# Patient Record
Sex: MALE | URBAN - METROPOLITAN AREA
[De-identification: names, ages, dates, MRNs, and addresses within clinical notes are randomized per-mention and may not be internally consistent; named-entity substitution may affect disease eponyms.]

---

## 2022-11-16 ENCOUNTER — APPOINTMENT (OUTPATIENT)
Dept: URBAN - METROPOLITAN AREA CLINIC 215 | Age: 67
Setting detail: DERMATOLOGY
End: 2022-11-21

## 2022-11-16 DIAGNOSIS — D22 MELANOCYTIC NEVI: ICD-10-CM

## 2022-11-16 DIAGNOSIS — D485 NEOPLASM OF UNCERTAIN BEHAVIOR OF SKIN: ICD-10-CM

## 2022-11-16 DIAGNOSIS — L81.4 OTHER MELANIN HYPERPIGMENTATION: ICD-10-CM

## 2022-11-16 DIAGNOSIS — D18.0 HEMANGIOMA: ICD-10-CM

## 2022-11-16 DIAGNOSIS — L57.0 ACTINIC KERATOSIS: ICD-10-CM

## 2022-11-16 DIAGNOSIS — L82.1 OTHER SEBORRHEIC KERATOSIS: ICD-10-CM

## 2022-11-16 PROBLEM — D18.01 HEMANGIOMA OF SKIN AND SUBCUTANEOUS TISSUE: Status: ACTIVE | Noted: 2022-11-16

## 2022-11-16 PROBLEM — D48.5 NEOPLASM OF UNCERTAIN BEHAVIOR OF SKIN: Status: ACTIVE | Noted: 2022-11-16

## 2022-11-16 PROBLEM — D22.5 MELANOCYTIC NEVI OF TRUNK: Status: ACTIVE | Noted: 2022-11-16

## 2022-11-16 PROCEDURE — OTHER PRESCRIPTION MEDICATION MANAGEMENT: OTHER

## 2022-11-16 PROCEDURE — 99204 OFFICE O/P NEW MOD 45 MIN: CPT | Mod: 25

## 2022-11-16 PROCEDURE — OTHER BIOPSY BY SHAVE METHOD: OTHER

## 2022-11-16 PROCEDURE — OTHER MIPS QUALITY: OTHER

## 2022-11-16 PROCEDURE — OTHER COUNSELING: OTHER

## 2022-11-16 PROCEDURE — 11102 TANGNTL BX SKIN SINGLE LES: CPT

## 2022-11-16 ASSESSMENT — LOCATION DETAILED DESCRIPTION DERM
LOCATION DETAILED: RIGHT INFERIOR TEMPLE
LOCATION DETAILED: LEFT SUPERIOR UPPER BACK
LOCATION DETAILED: RIGHT CENTRAL MALAR CHEEK
LOCATION DETAILED: LEFT MEDIAL UPPER BACK
LOCATION DETAILED: LEFT CENTRAL MALAR CHEEK
LOCATION DETAILED: LEFT SUPERIOR LATERAL MALAR CHEEK
LOCATION DETAILED: RIGHT MEDIAL FOREHEAD
LOCATION DETAILED: LEFT PROXIMAL POSTERIOR UPPER ARM
LOCATION DETAILED: RIGHT SUPERIOR MEDIAL MALAR CHEEK
LOCATION DETAILED: RIGHT MID-UPPER BACK
LOCATION DETAILED: RIGHT CENTRAL LATERAL NECK
LOCATION DETAILED: LEFT INFERIOR MEDIAL UPPER BACK

## 2022-11-16 ASSESSMENT — LOCATION ZONE DERM
LOCATION ZONE: ARM
LOCATION ZONE: FACE
LOCATION ZONE: NECK
LOCATION ZONE: TRUNK

## 2022-11-16 ASSESSMENT — LOCATION SIMPLE DESCRIPTION DERM
LOCATION SIMPLE: LEFT UPPER ARM
LOCATION SIMPLE: LEFT CHEEK
LOCATION SIMPLE: LEFT UPPER BACK
LOCATION SIMPLE: RIGHT UPPER BACK
LOCATION SIMPLE: RIGHT FOREHEAD
LOCATION SIMPLE: RIGHT CHEEK
LOCATION SIMPLE: RIGHT TEMPLE
LOCATION SIMPLE: NECK

## 2022-11-16 NOTE — PROCEDURE: PRESCRIPTION MEDICATION MANAGEMENT
Initiate Treatment: Skin Medicinals 5FU/Calcipotriene BID x 5 days.  Treat forehead/temples, then cheeks/nose.
Detail Level: Zone
Render In Strict Bullet Format?: No

## 2023-02-09 ENCOUNTER — APPOINTMENT (OUTPATIENT)
Dept: URBAN - METROPOLITAN AREA CLINIC 216 | Age: 68
Setting detail: DERMATOLOGY
End: 2023-02-13

## 2023-02-09 VITALS
OXYGEN SATURATION: 94 % | SYSTOLIC BLOOD PRESSURE: 164 MMHG | DIASTOLIC BLOOD PRESSURE: 90 MMHG | TEMPERATURE: 97.3 F | HEART RATE: 70 BPM

## 2023-02-09 PROBLEM — D04.4 CARCINOMA IN SITU OF SKIN OF SCALP AND NECK: Status: ACTIVE | Noted: 2023-02-09

## 2023-02-09 PROCEDURE — 12042 INTMD RPR N-HF/GENIT2.6-7.5: CPT

## 2023-02-09 PROCEDURE — 17311 MOHS 1 STAGE H/N/HF/G: CPT

## 2023-02-09 PROCEDURE — OTHER TREATMENT REGIMEN: OTHER

## 2023-02-09 PROCEDURE — OTHER MOHS SURGERY: OTHER

## 2023-02-09 NOTE — PROCEDURE: MOHS SURGERY
Medication/Dose: Adderall XR  Patient last seen by PCP: 01/29/20  Next office visit with PCP: none   Last Lab:02/24/17    PDMP needs to be reviewed by Provider    Sent to provider to approve or deny       Consent (Lip)/Introductory Paragraph: The rationale for Mohs was explained to the patient and consent was obtained. The risks, benefits and alternatives to therapy were discussed in detail. Specifically, the risks of lip deformity, changes in the oral aperture, infection, scarring, bleeding, prolonged wound healing, incomplete removal, allergy to anesthesia, nerve injury and recurrence were addressed. Prior to the procedure, the treatment site was clearly identified and confirmed by the patient. All components of Universal Protocol/PAUSE Rule completed.

## 2023-11-01 ENCOUNTER — APPOINTMENT (OUTPATIENT)
Dept: URBAN - METROPOLITAN AREA CLINIC 215 | Age: 68
Setting detail: DERMATOLOGY
End: 2023-11-01

## 2023-11-01 DIAGNOSIS — L81.4 OTHER MELANIN HYPERPIGMENTATION: ICD-10-CM

## 2023-11-01 DIAGNOSIS — D22 MELANOCYTIC NEVI: ICD-10-CM

## 2023-11-01 DIAGNOSIS — D485 NEOPLASM OF UNCERTAIN BEHAVIOR OF SKIN: ICD-10-CM

## 2023-11-01 DIAGNOSIS — D18.0 HEMANGIOMA: ICD-10-CM

## 2023-11-01 DIAGNOSIS — Z85.828 PERSONAL HISTORY OF OTHER MALIGNANT NEOPLASM OF SKIN: ICD-10-CM

## 2023-11-01 DIAGNOSIS — L82.1 OTHER SEBORRHEIC KERATOSIS: ICD-10-CM

## 2023-11-01 PROBLEM — D18.01 HEMANGIOMA OF SKIN AND SUBCUTANEOUS TISSUE: Status: ACTIVE | Noted: 2023-11-01

## 2023-11-01 PROBLEM — D48.5 NEOPLASM OF UNCERTAIN BEHAVIOR OF SKIN: Status: ACTIVE | Noted: 2023-11-01

## 2023-11-01 PROBLEM — D22.5 MELANOCYTIC NEVI OF TRUNK: Status: ACTIVE | Noted: 2023-11-01

## 2023-11-01 PROCEDURE — 99213 OFFICE O/P EST LOW 20 MIN: CPT | Mod: 25

## 2023-11-01 PROCEDURE — OTHER COUNSELING: OTHER

## 2023-11-01 PROCEDURE — 11102 TANGNTL BX SKIN SINGLE LES: CPT

## 2023-11-01 PROCEDURE — 11103 TANGNTL BX SKIN EA SEP/ADDL: CPT

## 2023-11-01 PROCEDURE — OTHER BIOPSY BY SHAVE METHOD: OTHER

## 2023-11-01 ASSESSMENT — LOCATION SIMPLE DESCRIPTION DERM
LOCATION SIMPLE: LEFT LOWER BACK
LOCATION SIMPLE: RIGHT UPPER BACK
LOCATION SIMPLE: LEFT UPPER BACK
LOCATION SIMPLE: NECK
LOCATION SIMPLE: RIGHT INDEX FINGER

## 2023-11-01 ASSESSMENT — LOCATION DETAILED DESCRIPTION DERM
LOCATION DETAILED: LEFT INFERIOR MEDIAL UPPER BACK
LOCATION DETAILED: RIGHT INDEX PROXIMAL INTERPHALANGEAL JOINT
LOCATION DETAILED: LEFT SUPERIOR MEDIAL LOWER BACK
LOCATION DETAILED: RIGHT CENTRAL LATERAL NECK
LOCATION DETAILED: LEFT MEDIAL UPPER BACK
LOCATION DETAILED: LEFT SUPERIOR UPPER BACK
LOCATION DETAILED: RIGHT MID-UPPER BACK

## 2023-11-01 ASSESSMENT — LOCATION ZONE DERM
LOCATION ZONE: TRUNK
LOCATION ZONE: NECK
LOCATION ZONE: FINGER

## 2023-11-01 NOTE — PROCEDURE: BIOPSY BY SHAVE METHOD
Body Location Override (Optional - Billing Will Still Be Based On Selected Body Map Location If Applicable): left medial lower back

## 2023-11-01 NOTE — PROCEDURE: BIOPSY BY SHAVE METHOD
Body Location Override (Optional - Billing Will Still Be Based On Selected Body Map Location If Applicable): right 2nd PIP

## 2023-11-01 NOTE — HPI: SKIN LESION
What Type Of Note Output Would You Prefer (Optional)?: Standard Output
Is This A New Presentation, Or A Follow-Up?: Skin Lesion
Additional History: Patient reports little bumps starting coming up 2 years ago. They come and will completely go away.

## 2024-11-06 ENCOUNTER — APPOINTMENT (OUTPATIENT)
Dept: URBAN - METROPOLITAN AREA CLINIC 215 | Age: 69
Setting detail: DERMATOLOGY
End: 2024-11-06

## 2024-11-06 DIAGNOSIS — D18.0 HEMANGIOMA: ICD-10-CM

## 2024-11-06 DIAGNOSIS — L82.1 OTHER SEBORRHEIC KERATOSIS: ICD-10-CM

## 2024-11-06 DIAGNOSIS — L81.4 OTHER MELANIN HYPERPIGMENTATION: ICD-10-CM

## 2024-11-06 DIAGNOSIS — Z85.828 PERSONAL HISTORY OF OTHER MALIGNANT NEOPLASM OF SKIN: ICD-10-CM

## 2024-11-06 DIAGNOSIS — B07.8 OTHER VIRAL WARTS: ICD-10-CM

## 2024-11-06 DIAGNOSIS — D22 MELANOCYTIC NEVI: ICD-10-CM

## 2024-11-06 DIAGNOSIS — M71 OTHER BURSOPATHIES: ICD-10-CM

## 2024-11-06 PROBLEM — D22.5 MELANOCYTIC NEVI OF TRUNK: Status: ACTIVE | Noted: 2024-11-06

## 2024-11-06 PROBLEM — M71.341 OTHER BURSAL CYST, RIGHT HAND: Status: ACTIVE | Noted: 2024-11-06

## 2024-11-06 PROBLEM — D18.01 HEMANGIOMA OF SKIN AND SUBCUTANEOUS TISSUE: Status: ACTIVE | Noted: 2024-11-06

## 2024-11-06 PROCEDURE — OTHER COUNSELING: OTHER

## 2024-11-06 PROCEDURE — 99213 OFFICE O/P EST LOW 20 MIN: CPT | Mod: 25

## 2024-11-06 PROCEDURE — 17110 DESTRUCT B9 LESION 1-14: CPT

## 2024-11-06 PROCEDURE — OTHER LIQUID NITROGEN: OTHER

## 2024-11-06 ASSESSMENT — LOCATION DETAILED DESCRIPTION DERM
LOCATION DETAILED: LEFT INFERIOR MEDIAL UPPER BACK
LOCATION DETAILED: RIGHT RING DISTAL INTERPHALANGEAL JOINT
LOCATION DETAILED: LEFT SUPERIOR UPPER BACK
LOCATION DETAILED: RIGHT MID-UPPER BACK
LOCATION DETAILED: LEFT MEDIAL UPPER BACK
LOCATION DETAILED: RIGHT CENTRAL LATERAL NECK
LOCATION DETAILED: RIGHT RADIAL DORSAL HAND
LOCATION DETAILED: RIGHT PROXIMAL RADIAL DORSAL RING FINGER

## 2024-11-06 ASSESSMENT — LOCATION ZONE DERM
LOCATION ZONE: NECK
LOCATION ZONE: FINGER
LOCATION ZONE: TRUNK
LOCATION ZONE: HAND

## 2024-11-06 ASSESSMENT — LOCATION SIMPLE DESCRIPTION DERM
LOCATION SIMPLE: LEFT UPPER BACK
LOCATION SIMPLE: RIGHT RING FINGER
LOCATION SIMPLE: RIGHT UPPER BACK
LOCATION SIMPLE: RIGHT HAND
LOCATION SIMPLE: NECK

## 2024-11-06 NOTE — PROCEDURE: LIQUID NITROGEN
Medical Necessity Clause: This procedure was medically necessary because the lesions that were treated were:
Spray Paint Technique: No
Medical Necessity Information: It is in your best interest to select a reason for this procedure from the list below. All of these items fulfill various CMS LCD requirements except the new and changing color options.
Show Topical Anesthesia Variable?: Yes
Aperture Size (Optional): D
Consent: The patient's consent was obtained including but not limited to risks of crusting, scabbing, blistering, scarring, darker or lighter pigmentary change, recurrence, incomplete removal and infection.
Detail Level: Detailed
Application Tool (Optional): Cry-AC
Duration Of Freeze Thaw-Cycle (Seconds): 5-10
Number Of Freeze-Thaw Cycles: 2 freeze-thaw cycles
Post-Care Instructions: I reviewed with the patient in detail post-care instructions. Patient is to wear sunprotection, and avoid picking at any of the treated lesions. Pt may apply Vaseline to crusted or scabbing areas.
Spray Paint Text: The liquid nitrogen was applied to the skin utilizing a spray paint frosting technique.